# Patient Record
Sex: FEMALE | URBAN - METROPOLITAN AREA
[De-identification: names, ages, dates, MRNs, and addresses within clinical notes are randomized per-mention and may not be internally consistent; named-entity substitution may affect disease eponyms.]

---

## 2024-03-08 RX ORDER — GLIMEPIRIDE 2 MG/1
2 TABLET ORAL 2 TIMES DAILY
COMMUNITY
End: 2024-03-11 | Stop reason: SDUPTHER

## 2024-03-08 RX ORDER — GABAPENTIN 300 MG/1
300 CAPSULE ORAL 2 TIMES DAILY
COMMUNITY
End: 2024-03-11 | Stop reason: SDUPTHER

## 2024-03-08 RX ORDER — MOMETASONE FUROATE 1 MG/G
1 CREAM TOPICAL 2 TIMES DAILY
COMMUNITY
Start: 2013-05-06 | End: 2024-03-11 | Stop reason: WASHOUT

## 2024-03-08 RX ORDER — BLOOD-GLUCOSE METER
KIT MISCELLANEOUS
COMMUNITY
Start: 2019-07-09

## 2024-03-11 ENCOUNTER — LAB (OUTPATIENT)
Dept: LAB | Facility: LAB | Age: 74
End: 2024-03-11
Payer: MEDICARE

## 2024-03-11 ENCOUNTER — OFFICE VISIT (OUTPATIENT)
Dept: PRIMARY CARE | Facility: CLINIC | Age: 74
End: 2024-03-11
Payer: MEDICARE

## 2024-03-11 VITALS
SYSTOLIC BLOOD PRESSURE: 140 MMHG | OXYGEN SATURATION: 96 % | WEIGHT: 158 LBS | TEMPERATURE: 97.6 F | HEIGHT: 65 IN | HEART RATE: 92 BPM | DIASTOLIC BLOOD PRESSURE: 76 MMHG | BODY MASS INDEX: 26.33 KG/M2

## 2024-03-11 DIAGNOSIS — E11.43 DIABETIC AUTONOMIC NEUROPATHY ASSOCIATED WITH TYPE 2 DIABETES MELLITUS (MULTI): ICD-10-CM

## 2024-03-11 DIAGNOSIS — Z00.00 MEDICARE ANNUAL WELLNESS VISIT, SUBSEQUENT: Primary | ICD-10-CM

## 2024-03-11 DIAGNOSIS — R21 RASH: ICD-10-CM

## 2024-03-11 LAB
ALBUMIN SERPL BCP-MCNC: 4.4 G/DL (ref 3.4–5)
ALP SERPL-CCNC: 56 U/L (ref 33–136)
ALT SERPL W P-5'-P-CCNC: 24 U/L (ref 7–45)
ANION GAP SERPL CALC-SCNC: 14 MMOL/L (ref 10–20)
AST SERPL W P-5'-P-CCNC: 26 U/L (ref 9–39)
BASOPHILS # BLD AUTO: 0.05 X10*3/UL (ref 0–0.1)
BASOPHILS NFR BLD AUTO: 0.7 %
BILIRUB SERPL-MCNC: 0.9 MG/DL (ref 0–1.2)
BUN SERPL-MCNC: 11 MG/DL (ref 6–23)
CALCIUM SERPL-MCNC: 10.3 MG/DL (ref 8.6–10.6)
CHLORIDE SERPL-SCNC: 100 MMOL/L (ref 98–107)
CHOLEST SERPL-MCNC: 281 MG/DL (ref 0–199)
CHOLESTEROL/HDL RATIO: 2.8
CO2 SERPL-SCNC: 27 MMOL/L (ref 21–32)
CREAT SERPL-MCNC: 0.63 MG/DL (ref 0.5–1.05)
EGFRCR SERPLBLD CKD-EPI 2021: >90 ML/MIN/1.73M*2
EOSINOPHIL # BLD AUTO: 0.2 X10*3/UL (ref 0–0.4)
EOSINOPHIL NFR BLD AUTO: 2.7 %
ERYTHROCYTE [DISTWIDTH] IN BLOOD BY AUTOMATED COUNT: 13.5 % (ref 11.5–14.5)
EST. AVERAGE GLUCOSE BLD GHB EST-MCNC: 169 MG/DL
GLUCOSE SERPL-MCNC: 151 MG/DL (ref 74–99)
HBA1C MFR BLD: 7.5 %
HCT VFR BLD AUTO: 46 % (ref 36–46)
HDLC SERPL-MCNC: 101.7 MG/DL
HGB BLD-MCNC: 14.3 G/DL (ref 12–16)
IMM GRANULOCYTES # BLD AUTO: 0.03 X10*3/UL (ref 0–0.5)
IMM GRANULOCYTES NFR BLD AUTO: 0.4 % (ref 0–0.9)
LDLC SERPL CALC-MCNC: 166 MG/DL
LYMPHOCYTES # BLD AUTO: 1.67 X10*3/UL (ref 0.8–3)
LYMPHOCYTES NFR BLD AUTO: 22.2 %
MCH RBC QN AUTO: 28 PG (ref 26–34)
MCHC RBC AUTO-ENTMCNC: 31.1 G/DL (ref 32–36)
MCV RBC AUTO: 90 FL (ref 80–100)
MONOCYTES # BLD AUTO: 0.44 X10*3/UL (ref 0.05–0.8)
MONOCYTES NFR BLD AUTO: 5.9 %
NEUTROPHILS # BLD AUTO: 5.12 X10*3/UL (ref 1.6–5.5)
NEUTROPHILS NFR BLD AUTO: 68.1 %
NON HDL CHOLESTEROL: 179 MG/DL (ref 0–149)
NRBC BLD-RTO: 0 /100 WBCS (ref 0–0)
PLATELET # BLD AUTO: 359 X10*3/UL (ref 150–450)
POTASSIUM SERPL-SCNC: 4.2 MMOL/L (ref 3.5–5.3)
PROT SERPL-MCNC: 7.5 G/DL (ref 6.4–8.2)
RBC # BLD AUTO: 5.1 X10*6/UL (ref 4–5.2)
SODIUM SERPL-SCNC: 137 MMOL/L (ref 136–145)
TRIGL SERPL-MCNC: 66 MG/DL (ref 0–149)
VIT B12 SERPL-MCNC: 470 PG/ML (ref 211–911)
VLDL: 13 MG/DL (ref 0–40)
WBC # BLD AUTO: 7.5 X10*3/UL (ref 4.4–11.3)

## 2024-03-11 PROCEDURE — 3077F SYST BP >= 140 MM HG: CPT | Performed by: FAMILY MEDICINE

## 2024-03-11 PROCEDURE — 83036 HEMOGLOBIN GLYCOSYLATED A1C: CPT

## 2024-03-11 PROCEDURE — 1170F FXNL STATUS ASSESSED: CPT | Performed by: FAMILY MEDICINE

## 2024-03-11 PROCEDURE — 99214 OFFICE O/P EST MOD 30 MIN: CPT | Performed by: FAMILY MEDICINE

## 2024-03-11 PROCEDURE — 1160F RVW MEDS BY RX/DR IN RCRD: CPT | Performed by: FAMILY MEDICINE

## 2024-03-11 PROCEDURE — 1126F AMNT PAIN NOTED NONE PRSNT: CPT | Performed by: FAMILY MEDICINE

## 2024-03-11 PROCEDURE — 1036F TOBACCO NON-USER: CPT | Performed by: FAMILY MEDICINE

## 2024-03-11 PROCEDURE — 3078F DIAST BP <80 MM HG: CPT | Performed by: FAMILY MEDICINE

## 2024-03-11 PROCEDURE — 36415 COLL VENOUS BLD VENIPUNCTURE: CPT

## 2024-03-11 PROCEDURE — 82607 VITAMIN B-12: CPT

## 2024-03-11 PROCEDURE — 80061 LIPID PANEL: CPT

## 2024-03-11 PROCEDURE — 85025 COMPLETE CBC W/AUTO DIFF WBC: CPT

## 2024-03-11 PROCEDURE — 80053 COMPREHEN METABOLIC PANEL: CPT

## 2024-03-11 PROCEDURE — 99497 ADVNCD CARE PLAN 30 MIN: CPT | Performed by: FAMILY MEDICINE

## 2024-03-11 PROCEDURE — 1159F MED LIST DOCD IN RCRD: CPT | Performed by: FAMILY MEDICINE

## 2024-03-11 PROCEDURE — G0439 PPPS, SUBSEQ VISIT: HCPCS | Performed by: FAMILY MEDICINE

## 2024-03-11 RX ORDER — GLIMEPIRIDE 2 MG/1
2 TABLET ORAL 2 TIMES DAILY
Qty: 180 TABLET | Refills: 3 | Status: SHIPPED | OUTPATIENT
Start: 2024-03-11 | End: 2025-03-11

## 2024-03-11 RX ORDER — GABAPENTIN 300 MG/1
300 CAPSULE ORAL 2 TIMES DAILY
Qty: 180 CAPSULE | Refills: 3 | Status: SHIPPED | OUTPATIENT
Start: 2024-03-11 | End: 2025-03-11

## 2024-03-11 RX ORDER — GLIMEPIRIDE 2 MG/1
2 TABLET ORAL 2 TIMES DAILY
Qty: 180 TABLET | Refills: 3 | Status: SHIPPED | OUTPATIENT
Start: 2024-03-11 | End: 2024-03-11 | Stop reason: SDUPTHER

## 2024-03-11 RX ORDER — TRIAMCINOLONE ACETONIDE 1 MG/G
CREAM TOPICAL 2 TIMES DAILY
Qty: 30 G | Refills: 2 | Status: SHIPPED | OUTPATIENT
Start: 2024-03-11

## 2024-03-11 ASSESSMENT — ENCOUNTER SYMPTOMS
DEPRESSION: 0
RESPIRATORY NEGATIVE: 1
CARDIOVASCULAR NEGATIVE: 1
GASTROINTESTINAL NEGATIVE: 1
LOSS OF SENSATION IN FEET: 0
OCCASIONAL FEELINGS OF UNSTEADINESS: 0
CONSTITUTIONAL NEGATIVE: 1
PSYCHIATRIC NEGATIVE: 1
NEUROLOGICAL NEGATIVE: 1
ENDOCRINE NEGATIVE: 1
MUSCULOSKELETAL NEGATIVE: 1
ENDOCRINE COMMENTS: DM

## 2024-03-11 ASSESSMENT — ACTIVITIES OF DAILY LIVING (ADL)
DRESSING YOURSELF: INDEPENDENT
PREPARING MEALS: INDEPENDENT
ADEQUATE_TO_COMPLETE_ADL: YES
GROCERY SHOPPING: INDEPENDENT
TOILETING: INDEPENDENT
STIL DRIVING: YES
PATIENT'S MEMORY ADEQUATE TO SAFELY COMPLETE DAILY ACTIVITIES?: YES
BATHING: INDEPENDENT
DOING HOUSEWORK: INDEPENDENT
USING TELEPHONE: INDEPENDENT
NEEDS ASSISTANCE WITH FOOD: INDEPENDENT
JUDGMENT_ADEQUATE_SAFELY_COMPLETE_DAILY_ACTIVITIES: YES
USING TRANSPORTATION: INDEPENDENT
EATING: INDEPENDENT
GROOMING: INDEPENDENT
MANAGING FINANCES: INDEPENDENT
WALKS IN HOME: INDEPENDENT
FEEDING YOURSELF: INDEPENDENT
TAKING MEDICATION: INDEPENDENT

## 2024-03-11 ASSESSMENT — GERIATRIC MINI NUTRITIONAL ASSESSMENT (MNA)
A HAS FOOD INTAKE DECLINED OVER THE PAST 3 MONTHS DUE TO LOSS OF APPETITE, DIGESTIVE PROBLEMS, CHEWING OR SWALLOWING DIFFICULTIES?: NO DECREASE IN FOOD INTAKE
B WEIGHT LOSS DURING THE LAST 3 MONTHS: NO WEIGHT LOSS
E NEUROPSYCHOLOGICAL PROBLEMS: NO PSYCHOLOGICAL PROBLEMS
C GENERAL MOBILITY: GOES OUT
D HAS SUFFERED PSYCHOLOGICAL STRESS OR ACUTE DISEASE IN THE PAST 3 MONTHS?: NO

## 2024-03-11 ASSESSMENT — ANXIETY QUESTIONNAIRES
2. NOT BEING ABLE TO STOP OR CONTROL WORRYING: NOT AT ALL
GAD7 TOTAL SCORE: 0
5. BEING SO RESTLESS THAT IT IS HARD TO SIT STILL: NOT AT ALL
3. WORRYING TOO MUCH ABOUT DIFFERENT THINGS: NOT AT ALL
1. FEELING NERVOUS, ANXIOUS, OR ON EDGE: NOT AT ALL
4. TROUBLE RELAXING: NOT AT ALL
7. FEELING AFRAID AS IF SOMETHING AWFUL MIGHT HAPPEN: NOT AT ALL
6. BECOMING EASILY ANNOYED OR IRRITABLE: NOT AT ALL

## 2024-03-11 ASSESSMENT — PAIN SCALES - GENERAL: PAINLEVEL: 0-NO PAIN

## 2024-03-11 NOTE — PROGRESS NOTES
"Subjective   Patient ID: Safia Pina is a 73 y.o. female who presents for Annual Exam (Assessment annual medicare wellness).    HPI     Review of Systems   Constitutional: Negative.    HENT: Negative.     Respiratory: Negative.     Cardiovascular: Negative.    Gastrointestinal: Negative.    Endocrine: Negative.         DM   Genitourinary: Negative.    Musculoskeletal: Negative.    Neurological: Negative.    Psychiatric/Behavioral: Negative.         Objective   /76 (BP Location: Left arm)   Pulse 92   Temp 36.4 °C (97.6 °F) (Temporal)   Ht 1.651 m (5' 5\")   Wt 71.7 kg (158 lb)   SpO2 96%   BMI 26.29 kg/m²     Physical Exam  Vitals and nursing note reviewed.   Constitutional:       Appearance: Normal appearance.   HENT:      Right Ear: Tympanic membrane normal.      Left Ear: Tympanic membrane normal.      Mouth/Throat:      Pharynx: Oropharynx is clear.   Cardiovascular:      Rate and Rhythm: Normal rate and regular rhythm.      Heart sounds: Normal heart sounds.   Pulmonary:      Breath sounds: Normal breath sounds.   Abdominal:      Palpations: Abdomen is soft.   Musculoskeletal:         General: Normal range of motion.   Feet:      Right foot:      Protective Sensation: 3 sites tested.        Left foot:      Protective Sensation: 3 sites tested.     Skin:     Comments: eczema   Neurological:      General: No focal deficit present.      Mental Status: She is alert and oriented to person, place, and time.   Psychiatric:         Mood and Affect: Mood normal.         Behavior: Behavior normal.         Assessment/Plan patient seen here for an annual Medicare wellness exam.  We reviewed her questionnaire she is agreeable to her responses.  We did discuss advanced directives.  She has no significant difficulty with depression or anxiety.  We are checking her lab work here today.  Will let her know the results as soon as available.  Will see her back in a year  Problem List Items Addressed This Visit        "      ICD-10-CM    Diabetic autonomic neuropathy associated with type 2 diabetes mellitus (CMS/[facility]) E11.43    Relevant Medications    glimepiride (Amaryl) 2 mg tablet    Other Relevant Orders    Lipid Panel    CBC and Auto Differential    Comprehensive Metabolic Panel    Hemoglobin A1C    Vitamin B12     Other Visit Diagnoses         Codes    Medicare annual wellness visit, subsequent    -  Primary Z00.00    BMI 26.0-26.9,adult     Z68.26    Rash     R21    Relevant Medications    triamcinolone (Kenalog) 0.1 % cream

## 2024-03-11 NOTE — ACP (ADVANCE CARE PLANNING)
Confirming Previous Code Status:   Advance Care Planning Note     Discussion Date: 03/11/24   Discussion Participants: patient    The patient wishes to discuss Advance Care Planning today and the following is a brief summary of our discussion.     Patient has capacity to make their own medical decisions: Yes  Health Care Agent/Surrogate Decision Maker documented in chart: Yes    Documents on file and valid:  Advance Directive/Living Will: Yes   Health Care Power of : Yes  Other: none    Communication of Medical Status/Prognosis:   yes     Communication of Treatment Goals/Options:   yes     Treatment Decisions  Goals of Care: survival is paramount regardless of prognosis, treatment outcome, or burden   yes  Follow Up Plan  no  Team Members  myself  Time Statement: Total face to face time spent on advance care planning was 16 minutes with 16 minutes spent in counseling, including the explanation.    Shaheed Hair DO  3/11/2024 12:26 PM

## 2024-07-12 ENCOUNTER — HOSPITAL ENCOUNTER (EMERGENCY)
Facility: HOSPITAL | Age: 74
Discharge: HOME | End: 2024-07-12
Attending: STUDENT IN AN ORGANIZED HEALTH CARE EDUCATION/TRAINING PROGRAM
Payer: MEDICARE

## 2024-07-12 VITALS
HEART RATE: 89 BPM | SYSTOLIC BLOOD PRESSURE: 192 MMHG | DIASTOLIC BLOOD PRESSURE: 81 MMHG | TEMPERATURE: 99.1 F | OXYGEN SATURATION: 97 % | RESPIRATION RATE: 20 BRPM

## 2024-07-12 DIAGNOSIS — M79.652 ACUTE PAIN OF LEFT THIGH: Primary | ICD-10-CM

## 2024-07-12 PROCEDURE — 99283 EMERGENCY DEPT VISIT LOW MDM: CPT | Performed by: STUDENT IN AN ORGANIZED HEALTH CARE EDUCATION/TRAINING PROGRAM

## 2024-07-12 PROCEDURE — 2500000004 HC RX 250 GENERAL PHARMACY W/ HCPCS (ALT 636 FOR OP/ED): Performed by: STUDENT IN AN ORGANIZED HEALTH CARE EDUCATION/TRAINING PROGRAM

## 2024-07-12 PROCEDURE — 96372 THER/PROPH/DIAG INJ SC/IM: CPT | Performed by: STUDENT IN AN ORGANIZED HEALTH CARE EDUCATION/TRAINING PROGRAM

## 2024-07-12 RX ORDER — KETOROLAC TROMETHAMINE 30 MG/ML
15 INJECTION, SOLUTION INTRAMUSCULAR; INTRAVENOUS ONCE
Status: COMPLETED | OUTPATIENT
Start: 2024-07-12 | End: 2024-07-12

## 2024-07-12 ASSESSMENT — PAIN DESCRIPTION - PAIN TYPE
TYPE: ACUTE PAIN
TYPE: ACUTE PAIN

## 2024-07-12 ASSESSMENT — PAIN - FUNCTIONAL ASSESSMENT
PAIN_FUNCTIONAL_ASSESSMENT: 0-10
PAIN_FUNCTIONAL_ASSESSMENT: 0-10

## 2024-07-12 ASSESSMENT — PAIN SCALES - GENERAL
PAINLEVEL_OUTOF10: 9
PAINLEVEL_OUTOF10: 5 - MODERATE PAIN
PAINLEVEL_OUTOF10: 9

## 2024-07-12 ASSESSMENT — COLUMBIA-SUICIDE SEVERITY RATING SCALE - C-SSRS
6. HAVE YOU EVER DONE ANYTHING, STARTED TO DO ANYTHING, OR PREPARED TO DO ANYTHING TO END YOUR LIFE?: NO
1. IN THE PAST MONTH, HAVE YOU WISHED YOU WERE DEAD OR WISHED YOU COULD GO TO SLEEP AND NOT WAKE UP?: NO
2. HAVE YOU ACTUALLY HAD ANY THOUGHTS OF KILLING YOURSELF?: NO

## 2024-07-12 ASSESSMENT — PAIN DESCRIPTION - ORIENTATION
ORIENTATION_2: LEFT;UPPER;POSTERIOR
ORIENTATION: LEFT

## 2024-07-12 ASSESSMENT — PAIN DESCRIPTION - LOCATION
LOCATION_2: LEG
LOCATION: KNEE

## 2024-07-12 ASSESSMENT — PAIN DESCRIPTION - FREQUENCY: FREQUENCY: CONSTANT/CONTINUOUS

## 2024-07-12 ASSESSMENT — PAIN DESCRIPTION - PROGRESSION: CLINICAL_PROGRESSION: NOT CHANGED

## 2024-07-12 ASSESSMENT — PAIN DESCRIPTION - ONSET: ONSET: SUDDEN

## 2024-07-12 NOTE — ED PROVIDER NOTES
HPI   Chief Complaint   Patient presents with    Fall     Patient states was walking with disabled son when son started to fall and she fell with him, now states back of left thigh and knee pain, unable to ambulate at this time       History of diabetes presents with left leg pain.  States that she was assisting her special needs son when he began to fell and she helped to and she tried to help catch him.  States that she somewhat bent down with him and noted a pain to the back of her leg.  Has been able to ambulate, and intermittently feels worsening pain to the back of the left leg.  No falls to the ground.  No other trauma to the area.  No trauma to the head.  No loss consciousness.  Did take Tylenol prior to arrival with some improvement.      History provided by:  Patient   used: No                        Davis Coma Scale Score: 15                     Patient History   Past Medical History:   Diagnosis Date    Body mass index (BMI) 24.0-24.9, adult 12/20/2021    Body mass index (BMI) of 24.0 to 24.9 in adult    Body mass index (BMI) 26.0-26.9, adult 03/14/2022    BMI 26.0-26.9,adult    Personal history of other specified conditions 03/29/2019    History of abnormal weight loss     Past Surgical History:   Procedure Laterality Date    OTHER SURGICAL HISTORY  03/29/2019    Gallbladder surgery     No family history on file.  Social History     Tobacco Use    Smoking status: Never    Smokeless tobacco: Never   Substance Use Topics    Alcohol use: Not on file    Drug use: Never       Physical Exam   ED Triage Vitals [07/12/24 1211]   Temperature Heart Rate Respirations BP   37.3 °C (99.1 °F) 89 20 (!) 192/81      Pulse Ox Temp Source Heart Rate Source Patient Position   97 % Skin Monitor Sitting      BP Location FiO2 (%)     Left arm --       Physical Exam  Vitals and nursing note reviewed.   HENT:      Head: Atraumatic.      Mouth/Throat:      Mouth: Mucous membranes are moist.   Eyes:       Conjunctiva/sclera: Conjunctivae normal.   Cardiovascular:      Rate and Rhythm: Normal rate and regular rhythm.   Pulmonary:      Effort: Pulmonary effort is normal.   Abdominal:      Palpations: Abdomen is soft.      Tenderness: There is no abdominal tenderness.   Musculoskeletal:         General: No deformity.      Cervical back: Normal range of motion.      Comments: No spinal tenderness palpation.  Pelvis is stable.  Right lower extremity: No obvious deformities.  No bony tenderness palpation.  Full active range of motion.  Neurovascular intact.  Left lower extremity: No obvious deformities.  There is no bony tenderness palpation.  There is tenderness to the posterior aspect of the thigh, particularly the mid/proximal hamstring area.  There is no obvious overlying ecchymosis.  No masses palpated.  Extensor mechanism is intact.  Full active range of motion.  5/5 strength in the extremity with pain with resisted knee flexion.  Sensation tact light touch in all dermatomal distributions.  2+ DP and PT pulse with brisk cap refill of all digits.  No ligamentous laxity with Lachman's, posterior drawer, varus load, or valgus load.   Skin:     General: Skin is warm and dry.   Neurological:      Mental Status: She is alert.      Comments: Moving all extremities         ED Course & MDM   ED Course as of 07/12/24 1315   Fri Jul 12, 2024   1313 Able to ambulate, although uncomfortable.  Given patient's location of pain as well as pain with knee flexion, suspect hamstring injury. [AB]      ED Course User Index  [AB] oJshua Lawson MD         Diagnoses as of 07/12/24 1315   Acute pain of left thigh       Medical Decision Making  Presents with left leg pain after attempting to catch her son that was falling to the ground.  Given the posterior location of the pain as well as pain with flexion at the knee, suspect hamstring injury, particularly strain.  Pain control with IM Toradol emergency department.  Will have her follow  with orthopedic surgery/sports medicine.  Discussed return precautions at length.      My suspicion for acute bony injury is quite low at this time will not obtain x-rays.            Procedure  Procedures     Joshua Lawson MD  07/12/24 3906

## 2024-07-12 NOTE — DISCHARGE INSTRUCTIONS
As we discussed the emergency department, we think that the pain to the back of your leg is from a hamstring injury.  We would encourage you to rest this muscle, ice it, elevate it and use medication such as Tylenol and ibuprofen.  If you are unable to walk or if the pain worsens, you can always return to the emergency department for further evaluation.  Please follow with your primary care physician.  You have also been given her referral to sports medicine and provided with names of local physicians.

## 2024-07-12 NOTE — ED TRIAGE NOTES
Patient states was walking with disabled son when son started to fall and she fell with him, now states back of left thigh and knee pain, unable to ambulate at this time

## 2024-07-15 ENCOUNTER — APPOINTMENT (OUTPATIENT)
Dept: ORTHOPEDIC SURGERY | Facility: CLINIC | Age: 74
End: 2024-07-15
Payer: MEDICARE

## 2024-07-15 VITALS — WEIGHT: 155 LBS | BODY MASS INDEX: 25.83 KG/M2 | HEIGHT: 65 IN

## 2024-07-15 DIAGNOSIS — M79.652 ACUTE PAIN OF LEFT THIGH: ICD-10-CM

## 2024-07-15 DIAGNOSIS — S76.312A HAMSTRING STRAIN, LEFT, INITIAL ENCOUNTER: Primary | ICD-10-CM

## 2024-07-15 PROCEDURE — 1160F RVW MEDS BY RX/DR IN RCRD: CPT | Performed by: FAMILY MEDICINE

## 2024-07-15 PROCEDURE — 3051F HG A1C>EQUAL 7.0%<8.0%: CPT | Performed by: FAMILY MEDICINE

## 2024-07-15 PROCEDURE — 1159F MED LIST DOCD IN RCRD: CPT | Performed by: FAMILY MEDICINE

## 2024-07-15 PROCEDURE — 1036F TOBACCO NON-USER: CPT | Performed by: FAMILY MEDICINE

## 2024-07-15 PROCEDURE — 99203 OFFICE O/P NEW LOW 30 MIN: CPT | Performed by: FAMILY MEDICINE

## 2024-07-15 PROCEDURE — 1157F ADVNC CARE PLAN IN RCRD: CPT | Performed by: FAMILY MEDICINE

## 2024-07-15 PROCEDURE — 3050F LDL-C >= 130 MG/DL: CPT | Performed by: FAMILY MEDICINE

## 2024-07-15 NOTE — PROGRESS NOTES
History of Present Illness   Chief Complaint   Patient presents with    Left Thigh - Pain     DOI:7/12/24  ATTEMPTED TO EASE THE FALL OF HER SON AND HAD PAIN IN HER HAMSTRING AFTER       The patient is 73 y.o. female  here with a complaint of left proximal leg/posterior thigh pain.  Onset of symptoms 3 days ago, says she was assisting her special-needs son when he began to fall, she tried to help catch him, and was bending down and felt some pain in the back of her leg/thigh.  She was having some difficulty/pain with walking/weightbearing prompting her to be seen in the emergency department, they recommended outpatient orthopedic follow-up, they did not obtain any x-rays given lack of significant trauma.  She was given some IM Toradol with good improvement in symptoms.  She admits to ongoing pain over the posterior aspect of the thigh.  Symptoms have improved some over the past 2 days but still present.  She has pain with sitting, she does have pain with walking.  She denies any bruising.    Past Medical History:   Diagnosis Date    Body mass index (BMI) 24.0-24.9, adult 12/20/2021    Body mass index (BMI) of 24.0 to 24.9 in adult    Body mass index (BMI) 26.0-26.9, adult 03/14/2022    BMI 26.0-26.9,adult    Personal history of other specified conditions 03/29/2019    History of abnormal weight loss       Medication Documentation Review Audit       Reviewed by Shaheed Hair DO (Physician) on 03/11/24 at 1213      Medication Order Taking? Sig Documenting Provider Last Dose Status   FreeStyle Lite Strips strip [id number]  TEST EVERY DAY AS DIRECTED Historical Provider, MD  Active   gabapentin (Neurontin) 300 mg capsule [id number]  Take 1 capsule (300 mg) by mouth 2 times a day. Historical Provider, MD  Active   glimepiride (Amaryl) 2 mg tablet [id number]  Take 1 tablet (2 mg) by mouth 2 times a day. Historical Provider, MD  Active     Discontinued 03/11/24 1212                    No Known Allergies    Social  History     Socioeconomic History    Marital status: Single     Spouse name: Not on file    Number of children: Not on file    Years of education: Not on file    Highest education level: Not on file   Occupational History    Not on file   Tobacco Use    Smoking status: Never    Smokeless tobacco: Never   Substance and Sexual Activity    Alcohol use: Never    Drug use: Never    Sexual activity: Not on file   Other Topics Concern    Not on file   Social History Narrative    Not on file     Social Determinants of Health     Financial Resource Strain: Not on file   Food Insecurity: Not on file   Transportation Needs: Not on file   Physical Activity: Not on file   Stress: Not on file   Social Connections: Not on file   Intimate Partner Violence: Not on file   Housing Stability: Not on file       Past Surgical History:   Procedure Laterality Date    OTHER SURGICAL HISTORY  03/29/2019    Gallbladder surgery          Review of Systems   GENERAL: Negative  GI: Negative  MUSCULOSKELETAL: See HPI  SKIN: Negative  NEURO:  Negative     Physical Exam:    General/Constitutional: well appearing, no distress, appears stated age  HEENT: sclera clear  Respiratory: non labored breathing  Vascular: No edema, swelling or tenderness, except as noted in detailed exam.  Integumentary: No impressive skin lesions present, except as noted in detailed exam.  Neurological:  Alert and oriented   Psychological:  Normal mood and affect.  Musculoskeletal: Normal, except as noted in detailed exam and in HPI.  Mildly antalgic gait, unassisted    Left lower extremity: Normal appearance, there is no ecchymosis in the posterior thigh.  She is most tender palpation at the midportion of the hamstring muscle belly, no distal hamstring tenderness, she has nontender at the ischial tuberosity/proximal hamstring, there is no palpable defect.  She has pain and weakness, 3 out of 5 with both resisted hip extension and knee flexion.  She has pain with passive hip  flexion with knee fully extended in the hamstring muscle belly.       Imaging: No imaging today      Assessment   1. Hamstring strain, left, initial encounter        2. Acute pain of left thigh  Referral to Sports Medicine            Plan: Discussed diagnosis, reviewed further workup and treatment.  Symptoms consistent with hamstring muscle belly strain, no concern for proximal hamstring tendon injury.  Recommending conservative management.  I did wrap in Ace bandage for some compression which she can do over the next few days, she will ice and take over-the-counter medications as needed, she can do some gentle hamstring stretching.  I like to see her back in 2 weeks for reassessment, she may need some formal physical therapy but she says that she has difficulty finding someone to care for her son when she is away.

## 2024-07-29 ENCOUNTER — APPOINTMENT (OUTPATIENT)
Dept: ORTHOPEDIC SURGERY | Facility: CLINIC | Age: 74
End: 2024-07-29
Payer: MEDICARE

## 2024-07-29 DIAGNOSIS — S76.312D HAMSTRING STRAIN, LEFT, SUBSEQUENT ENCOUNTER: Primary | ICD-10-CM

## 2024-07-29 PROCEDURE — 99213 OFFICE O/P EST LOW 20 MIN: CPT | Performed by: FAMILY MEDICINE

## 2024-07-29 PROCEDURE — 3051F HG A1C>EQUAL 7.0%<8.0%: CPT | Performed by: FAMILY MEDICINE

## 2024-07-29 PROCEDURE — 3050F LDL-C >= 130 MG/DL: CPT | Performed by: FAMILY MEDICINE

## 2024-07-29 PROCEDURE — 1157F ADVNC CARE PLAN IN RCRD: CPT | Performed by: FAMILY MEDICINE

## 2024-07-29 NOTE — PROGRESS NOTES
History of Present Illness   Chief Complaint   Patient presents with    Left Thigh - Pain     DOI:7/12/24       The patient is 73 y.o. female  here for follow-up of left hamstring injury.  Onset of symptoms a little over 2 weeks ago, injured trying to hold up her special-needs son who was falling.  At her initial visit symptoms were consistent with hamstring muscle belly strain with more prominent tenderness in the proximal muscle belly, no significant tenderness at the ischial tuberosity, there was weakness with hip extension and knee flexion.  Recommended conservative management with compression, gentle stretching, ice.  She admits to pretty significant improvement over the past 2 weeks, says after seeing me she did develop some more notable bruising in the back of her thigh which has progressed down to her knee.  She has been taking over-the-counter medication sparingly for pain.  She does have some pain with sitting, she is having less pain with walking.  She denies any new injuries or symptoms.        Past Medical History:   Diagnosis Date    Body mass index (BMI) 24.0-24.9, adult 12/20/2021    Body mass index (BMI) of 24.0 to 24.9 in adult    Body mass index (BMI) 26.0-26.9, adult 03/14/2022    BMI 26.0-26.9,adult    Personal history of other specified conditions 03/29/2019    History of abnormal weight loss       Medication Documentation Review Audit       Reviewed by Conner Varner MD (Physician) on 07/15/24 at 1528      Medication Order Taking? Sig Documenting Provider Last Dose Status   FreeStyle Lite Strips strip [id number]  TEST EVERY DAY AS DIRECTED Historical Provider, MD  Active   gabapentin (Neurontin) 300 mg capsule [id number]  Take 1 capsule (300 mg) by mouth 2 times a day. Shaheed Hair, DO  Active   glimepiride (Amaryl) 2 mg tablet [id number]  Take 1 tablet (2 mg) by mouth 2 times a day. Shaheed Hair DO  Active   triamcinolone (Kenalog) 0.1 % cream [id number]  Apply topically 2 times a  day. Apply to affected area 1-2 times daily as needed. Avoid face and groin. Shaheed ALLISON Sustersic, DO  Active                    No Known Allergies    Social History     Socioeconomic History    Marital status: Single     Spouse name: Not on file    Number of children: Not on file    Years of education: Not on file    Highest education level: Not on file   Occupational History    Not on file   Tobacco Use    Smoking status: Never    Smokeless tobacco: Never   Substance and Sexual Activity    Alcohol use: Never    Drug use: Never    Sexual activity: Not on file   Other Topics Concern    Not on file   Social History Narrative    Not on file     Social Determinants of Health     Financial Resource Strain: Not on file   Food Insecurity: Not on file   Transportation Needs: Not on file   Physical Activity: Not on file   Stress: Not on file   Social Connections: Not on file   Intimate Partner Violence: Not on file   Housing Stability: Not on file       Past Surgical History:   Procedure Laterality Date    OTHER SURGICAL HISTORY  03/29/2019    Gallbladder surgery          Review of Systems   GENERAL: Negative  GI: Negative  MUSCULOSKELETAL: See HPI  SKIN: Negative  NEURO:  Negative     Physical Exam:    General/Constitutional: well appearing, no distress, appears stated age  HEENT: sclera clear  Respiratory: non labored breathing  Vascular: No edema, swelling or tenderness, except as noted in detailed exam.  Integumentary: No impressive skin lesions present, except as noted in detailed exam.  Neurological:  Alert and oriented   Psychological:  Normal mood and affect.  Musculoskeletal: Normal, except as noted in detailed exam and in HPI.  Mildly antalgic gait, unassisted    Left lower extremity: There is some interval development of ecchymosis in the posterior thigh to below the knee, otherwise normal appearance, thigh is soft, compressible.  She remains tender at the proximal hamstring muscle belly, nontender at the ischial  tuberosity.  She has improved strength with resisted knee flexion 4-5, still with pain, 3 out of 5 strength with resisted hip extension with pain.  There is discomfort with passive hamstring stretch, improved from previous visit       Imaging: No imaging today      Assessment   1. Hamstring strain, left, subsequent encounter          2 weeks out from injury, improving, developed some more notable bruising suggestive of higher grade strain, symptoms/tenderness most prominent over muscle belly, still with no tenderness at the ischial tuberosity      Plan: Recommending conservative management, she was given a home exercise rehabilitation program that she began that focuses on some hamstring stretching, gentle strengthening, activation exercises, dissipate continued improvement in symptoms with conservative treatment.  He preferred home exercise therapy over  in person therapy because of the need to care for her special needs son with lack of help at home.  She will follow-up in 1 month for reassessment.

## 2024-08-26 ENCOUNTER — APPOINTMENT (OUTPATIENT)
Dept: ORTHOPEDIC SURGERY | Facility: CLINIC | Age: 74
End: 2024-08-26
Payer: MEDICARE

## 2024-08-26 DIAGNOSIS — S76.312D HAMSTRING STRAIN, LEFT, SUBSEQUENT ENCOUNTER: Primary | ICD-10-CM

## 2024-08-26 PROCEDURE — 3051F HG A1C>EQUAL 7.0%<8.0%: CPT | Performed by: FAMILY MEDICINE

## 2024-08-26 PROCEDURE — 1160F RVW MEDS BY RX/DR IN RCRD: CPT | Performed by: FAMILY MEDICINE

## 2024-08-26 PROCEDURE — 3050F LDL-C >= 130 MG/DL: CPT | Performed by: FAMILY MEDICINE

## 2024-08-26 PROCEDURE — 1159F MED LIST DOCD IN RCRD: CPT | Performed by: FAMILY MEDICINE

## 2024-08-26 PROCEDURE — 99213 OFFICE O/P EST LOW 20 MIN: CPT | Performed by: FAMILY MEDICINE

## 2024-08-26 PROCEDURE — 1157F ADVNC CARE PLAN IN RCRD: CPT | Performed by: FAMILY MEDICINE

## 2024-08-26 PROCEDURE — 1036F TOBACCO NON-USER: CPT | Performed by: FAMILY MEDICINE

## 2024-08-26 NOTE — PROGRESS NOTES
History of Present Illness   Chief Complaint   Patient presents with    Left Thigh - Follow-up       The patient is 73 y.o. female  here for follow-up of left hamstring injury.  Patient is approximately 6 weeks out from injury which occurred trying to hold up her special-needs son who is falling.  Symptoms at initial and 2-week follow-up visit were consistent with injury to the muscle belly proximally with less concern for proximal hamstring injury at ischial tuberosity.  At her 2-week follow-up visit there was some more notable bruising in the thigh and posterior knee, symptoms were overall improving.  Patient was interested in conservative management, she preferred home exercise rehabilitation over formal physical therapy because of a concern for anyone to help care for her son.  She has been doing home exercises for the past 4 weeks.  She does admit to continued improvement in symptoms overall.  Still having some very minimal residual discomfort in the hamstring area.  She said all of her bruising has resolved.  She is not having any issues caring for her son at this time.  She is pleased with her progress.  She says that she could be better with her home exercises, doing very infrequently.          Past Medical History:   Diagnosis Date    Body mass index (BMI) 24.0-24.9, adult 12/20/2021    Body mass index (BMI) of 24.0 to 24.9 in adult    Body mass index (BMI) 26.0-26.9, adult 03/14/2022    BMI 26.0-26.9,adult    Personal history of other specified conditions 03/29/2019    History of abnormal weight loss       Medication Documentation Review Audit       Reviewed by Conner Varner MD (Physician) on 07/15/24 at 1528      Medication Order Taking? Sig Documenting Provider Last Dose Status   FreeStyle Lite Strips strip [id number]  TEST EVERY DAY AS DIRECTED Historical Provider, MD  Active   gabapentin (Neurontin) 300 mg capsule [id number]  Take 1 capsule (300 mg) by mouth 2 times a day. Shaheed Hair, DO  Active    glimepiride (Amaryl) 2 mg tablet [id number]  Take 1 tablet (2 mg) by mouth 2 times a day. Shaheed Hair, DO  Active   triamcinolone (Kenalog) 0.1 % cream [id number]  Apply topically 2 times a day. Apply to affected area 1-2 times daily as needed. Avoid face and groin. Shaheed Jamesic, DO  Active                    No Known Allergies    Social History     Socioeconomic History    Marital status: Single     Spouse name: Not on file    Number of children: Not on file    Years of education: Not on file    Highest education level: Not on file   Occupational History    Not on file   Tobacco Use    Smoking status: Never    Smokeless tobacco: Never   Substance and Sexual Activity    Alcohol use: Never    Drug use: Never    Sexual activity: Not on file   Other Topics Concern    Not on file   Social History Narrative    Not on file     Social Determinants of Health     Financial Resource Strain: Not on file   Food Insecurity: Not on file   Transportation Needs: Not on file   Physical Activity: Not on file   Stress: Not on file   Social Connections: Not on file   Intimate Partner Violence: Not on file   Housing Stability: Not on file       Past Surgical History:   Procedure Laterality Date    OTHER SURGICAL HISTORY  03/29/2019    Gallbladder surgery          Review of Systems   GENERAL: Negative  GI: Negative  MUSCULOSKELETAL: See HPI  SKIN: Negative  NEURO:  Negative     Physical Exam:    General/Constitutional: well appearing, no distress, appears stated age  HEENT: sclera clear  Respiratory: non labored breathing  Vascular: No edema, swelling or tenderness, except as noted in detailed exam.  Integumentary: No impressive skin lesions present, except as noted in detailed exam.  Neurological:  Alert and oriented   Psychological:  Normal mood and affect.  Musculoskeletal: Normal, except as noted in detailed exam and in HPI.  Mildly antalgic gait, unassisted    Left lower extremity: Normal appearance, ecchymosis has  resolved.  There is some mild residual tenderness at the proximal hamstring muscle belly. nontender at the ischial tuberosity.  She has good improvement in strength, 5 out of 5 with resisted knee flexion, 4+ out of 5 with resisted hip extension both with very mild pain.  There is some mild discomfort with passive hamstring stretch.         Imaging: No imaging today      Assessment   1. Hamstring strain, left, subsequent encounter            6 weeks out from injury, treating conservatively, overall improving with some mild residual discomfort    Plan: Recommend continue conservative management, did encourage her to attempt some more home exercises to help with her mild residual discomfort though this may improve on its own with time.  Patient voiced understanding.  At this time she will plan to follow-up as symptoms dictate.

## 2025-03-17 ENCOUNTER — APPOINTMENT (OUTPATIENT)
Dept: PRIMARY CARE | Facility: CLINIC | Age: 75
End: 2025-03-17
Payer: MEDICARE

## 2025-03-17 ENCOUNTER — HOSPITAL ENCOUNTER (OUTPATIENT)
Dept: RADIOLOGY | Facility: CLINIC | Age: 75
Discharge: HOME | End: 2025-03-17
Payer: MEDICARE

## 2025-03-17 VITALS
SYSTOLIC BLOOD PRESSURE: 178 MMHG | WEIGHT: 155.8 LBS | TEMPERATURE: 98.6 F | HEART RATE: 92 BPM | OXYGEN SATURATION: 94 % | BODY MASS INDEX: 25.96 KG/M2 | DIASTOLIC BLOOD PRESSURE: 74 MMHG | HEIGHT: 65 IN

## 2025-03-17 DIAGNOSIS — M51.9 LUMBAR DISC DISEASE: ICD-10-CM

## 2025-03-17 DIAGNOSIS — M75.22 BICEPS TENDINITIS OF LEFT UPPER EXTREMITY: ICD-10-CM

## 2025-03-17 DIAGNOSIS — Z00.00 MEDICARE ANNUAL WELLNESS VISIT, SUBSEQUENT: Primary | ICD-10-CM

## 2025-03-17 DIAGNOSIS — E11.43 DIABETIC AUTONOMIC NEUROPATHY ASSOCIATED WITH TYPE 2 DIABETES MELLITUS (MULTI): ICD-10-CM

## 2025-03-17 DIAGNOSIS — E66.3 OVERWEIGHT WITH BODY MASS INDEX (BMI) 25.0-29.9: ICD-10-CM

## 2025-03-17 DIAGNOSIS — I10 BENIGN ESSENTIAL HYPERTENSION: ICD-10-CM

## 2025-03-17 DIAGNOSIS — E78.5 HYPERLIPIDEMIA, UNSPECIFIED HYPERLIPIDEMIA TYPE: ICD-10-CM

## 2025-03-17 PROBLEM — R53.83 FATIGUE: Status: ACTIVE | Noted: 2025-03-17

## 2025-03-17 PROBLEM — M17.12 PRIMARY OSTEOARTHRITIS OF LEFT KNEE: Status: ACTIVE | Noted: 2025-03-17

## 2025-03-17 PROBLEM — H93.11 SUBJECTIVE TINNITUS OF RIGHT EAR: Status: ACTIVE | Noted: 2025-03-17

## 2025-03-17 PROBLEM — H93.19 TINNITUS: Status: ACTIVE | Noted: 2025-03-17

## 2025-03-17 PROBLEM — L30.9 ECZEMA: Status: ACTIVE | Noted: 2025-03-17

## 2025-03-17 PROBLEM — R21 RASH: Status: ACTIVE | Noted: 2025-03-17

## 2025-03-17 PROBLEM — H90.3 SENSORINEURAL HEARING LOSS, BILATERAL: Status: ACTIVE | Noted: 2025-03-17

## 2025-03-17 PROBLEM — M75.20 BICEPS TENDINITIS: Status: ACTIVE | Noted: 2025-03-17

## 2025-03-17 PROBLEM — E11.40 DIABETIC NEUROPATHY (MULTI): Status: ACTIVE | Noted: 2025-03-17

## 2025-03-17 PROBLEM — R63.4 ABNORMAL WEIGHT LOSS: Status: ACTIVE | Noted: 2025-03-17

## 2025-03-17 PROCEDURE — 1157F ADVNC CARE PLAN IN RCRD: CPT | Performed by: FAMILY MEDICINE

## 2025-03-17 PROCEDURE — 3078F DIAST BP <80 MM HG: CPT | Performed by: FAMILY MEDICINE

## 2025-03-17 PROCEDURE — 1170F FXNL STATUS ASSESSED: CPT | Performed by: FAMILY MEDICINE

## 2025-03-17 PROCEDURE — 73030 X-RAY EXAM OF SHOULDER: CPT | Mod: LT

## 2025-03-17 PROCEDURE — 99497 ADVNCD CARE PLAN 30 MIN: CPT | Performed by: FAMILY MEDICINE

## 2025-03-17 PROCEDURE — 3077F SYST BP >= 140 MM HG: CPT | Performed by: FAMILY MEDICINE

## 2025-03-17 PROCEDURE — 73030 X-RAY EXAM OF SHOULDER: CPT | Mod: LEFT SIDE | Performed by: RADIOLOGY

## 2025-03-17 PROCEDURE — G0439 PPPS, SUBSEQ VISIT: HCPCS | Performed by: FAMILY MEDICINE

## 2025-03-17 PROCEDURE — 1160F RVW MEDS BY RX/DR IN RCRD: CPT | Performed by: FAMILY MEDICINE

## 2025-03-17 PROCEDURE — 99214 OFFICE O/P EST MOD 30 MIN: CPT | Performed by: FAMILY MEDICINE

## 2025-03-17 PROCEDURE — 1125F AMNT PAIN NOTED PAIN PRSNT: CPT | Performed by: FAMILY MEDICINE

## 2025-03-17 PROCEDURE — 1036F TOBACCO NON-USER: CPT | Performed by: FAMILY MEDICINE

## 2025-03-17 PROCEDURE — 1159F MED LIST DOCD IN RCRD: CPT | Performed by: FAMILY MEDICINE

## 2025-03-17 RX ORDER — NITROFURANTOIN 25; 75 MG/1; MG/1
CAPSULE ORAL
COMMUNITY
Start: 2021-08-10 | End: 2025-03-17 | Stop reason: ALTCHOICE

## 2025-03-17 RX ORDER — METHYLPREDNISOLONE 4 MG/1
TABLET ORAL
COMMUNITY
Start: 2022-12-21 | End: 2025-03-17 | Stop reason: ALTCHOICE

## 2025-03-17 ASSESSMENT — ACTIVITIES OF DAILY LIVING (ADL)
GROOMING: INDEPENDENT
STIL DRIVING: YES
NEEDS ASSISTANCE WITH FOOD: INDEPENDENT
FEEDING YOURSELF: INDEPENDENT
USING TRANSPORTATION: INDEPENDENT
EATING: INDEPENDENT
DRESSING YOURSELF: INDEPENDENT
JUDGMENT_ADEQUATE_SAFELY_COMPLETE_DAILY_ACTIVITIES: YES
TOILETING: INDEPENDENT
PREPARING MEALS: INDEPENDENT
USING TELEPHONE: INDEPENDENT
WALKS IN HOME: INDEPENDENT
DOING HOUSEWORK: INDEPENDENT
ADEQUATE_TO_COMPLETE_ADL: YES
PATIENT'S MEMORY ADEQUATE TO SAFELY COMPLETE DAILY ACTIVITIES?: YES
MANAGING FINANCES: INDEPENDENT
BATHING: INDEPENDENT
TAKING MEDICATION: INDEPENDENT
GROCERY SHOPPING: INDEPENDENT

## 2025-03-17 ASSESSMENT — GERIATRIC MINI NUTRITIONAL ASSESSMENT (MNA)
B WEIGHT LOSS DURING THE LAST 3 MONTHS: NO WEIGHT LOSS
C GENERAL MOBILITY: GOES OUT
A HAS FOOD INTAKE DECLINED OVER THE PAST 3 MONTHS DUE TO LOSS OF APPETITE, DIGESTIVE PROBLEMS, CHEWING OR SWALLOWING DIFFICULTIES?: NO DECREASE IN FOOD INTAKE
E NEUROPSYCHOLOGICAL PROBLEMS: NO PSYCHOLOGICAL PROBLEMS
D HAS SUFFERED PSYCHOLOGICAL STRESS OR ACUTE DISEASE IN THE PAST 3 MONTHS?: NO

## 2025-03-17 ASSESSMENT — ENCOUNTER SYMPTOMS
ENDOCRINE NEGATIVE: 1
CONSTITUTIONAL NEGATIVE: 1
NEUROLOGICAL NEGATIVE: 1
GASTROINTESTINAL NEGATIVE: 1
NERVOUS/ANXIOUS: 1
ARTHRALGIAS: 1
DEPRESSION: 0
RESPIRATORY NEGATIVE: 1
LOSS OF SENSATION IN FEET: 0
CARDIOVASCULAR NEGATIVE: 1
ENDOCRINE COMMENTS: DM
OCCASIONAL FEELINGS OF UNSTEADINESS: 0
BACK PAIN: 1

## 2025-03-17 ASSESSMENT — ANXIETY QUESTIONNAIRES
GAD7 TOTAL SCORE: 0
4. TROUBLE RELAXING: NOT AT ALL
2. NOT BEING ABLE TO STOP OR CONTROL WORRYING: NOT AT ALL
7. FEELING AFRAID AS IF SOMETHING AWFUL MIGHT HAPPEN: NOT AT ALL
3. WORRYING TOO MUCH ABOUT DIFFERENT THINGS: NOT AT ALL
1. FEELING NERVOUS, ANXIOUS, OR ON EDGE: NOT AT ALL
5. BEING SO RESTLESS THAT IT IS HARD TO SIT STILL: NOT AT ALL
6. BECOMING EASILY ANNOYED OR IRRITABLE: NOT AT ALL

## 2025-03-17 ASSESSMENT — PATIENT HEALTH QUESTIONNAIRE - PHQ9
2. FEELING DOWN, DEPRESSED OR HOPELESS: NOT AT ALL
1. LITTLE INTEREST OR PLEASURE IN DOING THINGS: NOT AT ALL
SUM OF ALL RESPONSES TO PHQ9 QUESTIONS 1 AND 2: 0

## 2025-03-17 ASSESSMENT — PAIN SCALES - GENERAL: PAINLEVEL_OUTOF10: 7

## 2025-03-17 NOTE — ACP (ADVANCE CARE PLANNING)
Confirming Previous Code Status:   Advance Care Planning Note     Discussion Date: 03/17/25   Discussion Participants: patient    The patient wishes to discuss Advance Care Planning today and the following is a brief summary of our discussion.     Patient has capacity to make their own medical decisions: Yes  Health Care Agent/Surrogate Decision Maker documented in chart: Yes    Documents on file and valid:  Advance Directive/Living Will: Yes   Health Care Power of : Yes  Other: none    Communication of Medical Status/Prognosis:   yes     Communication of Treatment Goals/Options:   yes     Treatment Decisions  Goals of Care: survival is paramount regardless of prognosis, treatment outcome, or burden   yes  Follow Up Plan  no  Team Members  myself  Time Statement: Total face to face time spent on advance care planning was 16 minutes with 16 minutes spent in counseling, including the explanation.    Shaheed Hair DO  3/17/2025 11:07 AM

## 2025-03-17 NOTE — PROGRESS NOTES
"Subjective   Patient ID: Safia Pina is a 74 y.o. female who presents for Annual Exam (Assessment annual medicare wellness fasting bw).    HPI     Review of Systems   Constitutional: Negative.    HENT: Negative.     Respiratory: Negative.     Cardiovascular: Negative.    Gastrointestinal: Negative.    Endocrine: Negative.         DM   Genitourinary: Negative.    Musculoskeletal:  Positive for arthralgias and back pain.        Pain left shoulder   Neurological: Negative.    Psychiatric/Behavioral:  The patient is nervous/anxious.        Objective   /74 (BP Location: Left arm)   Pulse 92   Temp 37 °C (98.6 °F) (Oral)   Ht 1.651 m (5' 5\")   Wt 70.7 kg (155 lb 12.8 oz)   SpO2 94%   BMI 25.93 kg/m²     Physical Exam  Vitals and nursing note reviewed.   Constitutional:       Appearance: Normal appearance.   HENT:      Right Ear: Tympanic membrane normal.      Left Ear: Tympanic membrane normal.      Mouth/Throat:      Pharynx: Oropharynx is clear.   Cardiovascular:      Rate and Rhythm: Normal rate and regular rhythm.      Pulses: Normal pulses.      Heart sounds: Murmur heard.      Systolic murmur is present with a grade of 2/6.   Pulmonary:      Breath sounds: Normal breath sounds.   Abdominal:      Palpations: Abdomen is soft.   Musculoskeletal:      Comments: Pain left shoulder decreased ROM rotator cuff weakness   Neurological:      General: No focal deficit present.      Mental Status: She is alert and oriented to person, place, and time.   Psychiatric:         Mood and Affect: Mood normal.         Behavior: Behavior normal.         Assessment/Plan patient seen here for an annual Medicare wellness exam.  We reviewed her questionnaire she is agreeable to her responses.  We did discuss advanced directives.  She has no significant difficulty with depression however she has significant amount of anxiety she cares for her son who is disabled severely.  We are having her get lab work drawn today she is " getting an x-ray of her left shoulder possibly may need orthopedic referral or at least an intra-articular injection pending that outcome.  Will see her back as needed  Problem List Items Addressed This Visit             ICD-10-CM    Diabetic autonomic neuropathy associated with type 2 diabetes mellitus (Multi) E11.43    Relevant Orders    Hemoglobin A1C    Albumin-Creatinine Ratio, Urine Random    Benign essential hypertension I10    Biceps tendinitis M75.20    Relevant Orders    XR shoulder left 2+ views    Hyperlipidemia E78.5    Relevant Orders    Lipid Panel    CBC and Auto Differential    Comprehensive Metabolic Panel    Lumbar disc disease M51.9    Overweight with body mass index (BMI) 25.0-29.9 E66.3     Other Visit Diagnoses         Codes    Medicare annual wellness visit, subsequent    -  Primary Z00.00

## 2025-03-18 LAB
ALBUMIN SERPL-MCNC: 4.5 G/DL (ref 3.6–5.1)
ALBUMIN/CREAT UR: 7 MG/G CREAT
ALP SERPL-CCNC: 57 U/L (ref 37–153)
ALT SERPL-CCNC: 19 U/L (ref 6–29)
ANION GAP SERPL CALCULATED.4IONS-SCNC: 12 MMOL/L (CALC) (ref 7–17)
AST SERPL-CCNC: 22 U/L (ref 10–35)
BASOPHILS # BLD AUTO: 58 CELLS/UL (ref 0–200)
BASOPHILS NFR BLD AUTO: 0.8 %
BILIRUB SERPL-MCNC: 0.8 MG/DL (ref 0.2–1.2)
BUN SERPL-MCNC: 18 MG/DL (ref 7–25)
CALCIUM SERPL-MCNC: 9.9 MG/DL (ref 8.6–10.4)
CHLORIDE SERPL-SCNC: 102 MMOL/L (ref 98–110)
CHOLEST SERPL-MCNC: 255 MG/DL
CHOLEST/HDLC SERPL: 2.9 (CALC)
CO2 SERPL-SCNC: 22 MMOL/L (ref 20–32)
CREAT SERPL-MCNC: 0.61 MG/DL (ref 0.6–1)
CREAT UR-MCNC: 54 MG/DL (ref 20–275)
EGFRCR SERPLBLD CKD-EPI 2021: 93 ML/MIN/1.73M2
EOSINOPHIL # BLD AUTO: 187 CELLS/UL (ref 15–500)
EOSINOPHIL NFR BLD AUTO: 2.6 %
ERYTHROCYTE [DISTWIDTH] IN BLOOD BY AUTOMATED COUNT: 12.5 % (ref 11–15)
EST. AVERAGE GLUCOSE BLD GHB EST-MCNC: 171 MG/DL
EST. AVERAGE GLUCOSE BLD GHB EST-SCNC: 9.5 MMOL/L
GLUCOSE SERPL-MCNC: 188 MG/DL (ref 65–99)
HBA1C MFR BLD: 7.6 % OF TOTAL HGB
HCT VFR BLD AUTO: 43 % (ref 35–45)
HDLC SERPL-MCNC: 89 MG/DL
HGB BLD-MCNC: 13.9 G/DL (ref 11.7–15.5)
LDLC SERPL CALC-MCNC: 150 MG/DL (CALC)
LYMPHOCYTES # BLD AUTO: 1296 CELLS/UL (ref 850–3900)
LYMPHOCYTES NFR BLD AUTO: 18 %
MCH RBC QN AUTO: 29.1 PG (ref 27–33)
MCHC RBC AUTO-ENTMCNC: 32.3 G/DL (ref 32–36)
MCV RBC AUTO: 90.1 FL (ref 80–100)
MICROALBUMIN UR-MCNC: 0.4 MG/DL
MONOCYTES # BLD AUTO: 439 CELLS/UL (ref 200–950)
MONOCYTES NFR BLD AUTO: 6.1 %
NEUTROPHILS # BLD AUTO: 5220 CELLS/UL (ref 1500–7800)
NEUTROPHILS NFR BLD AUTO: 72.5 %
NONHDLC SERPL-MCNC: 166 MG/DL (CALC)
PLATELET # BLD AUTO: 344 THOUSAND/UL (ref 140–400)
PMV BLD REES-ECKER: 10.9 FL (ref 7.5–12.5)
POTASSIUM SERPL-SCNC: 5 MMOL/L (ref 3.5–5.3)
PROT SERPL-MCNC: 7.2 G/DL (ref 6.1–8.1)
RBC # BLD AUTO: 4.77 MILLION/UL (ref 3.8–5.1)
SODIUM SERPL-SCNC: 136 MMOL/L (ref 135–146)
TRIGL SERPL-MCNC: 67 MG/DL
WBC # BLD AUTO: 7.2 THOUSAND/UL (ref 3.8–10.8)

## 2025-06-02 DIAGNOSIS — E11.43 DIABETIC AUTONOMIC NEUROPATHY ASSOCIATED WITH TYPE 2 DIABETES MELLITUS: ICD-10-CM

## 2025-06-02 RX ORDER — GLIMEPIRIDE 2 MG/1
2 TABLET ORAL 2 TIMES DAILY
Qty: 180 TABLET | Refills: 3 | Status: SHIPPED | OUTPATIENT
Start: 2025-06-02 | End: 2026-06-02

## 2025-06-27 ENCOUNTER — OFFICE VISIT (OUTPATIENT)
Dept: URGENT CARE | Age: 75
End: 2025-06-27
Payer: MEDICARE

## 2025-06-27 ENCOUNTER — ANCILLARY PROCEDURE (OUTPATIENT)
Dept: URGENT CARE | Age: 75
End: 2025-06-27
Payer: MEDICARE

## 2025-06-27 VITALS
TEMPERATURE: 98.2 F | DIASTOLIC BLOOD PRESSURE: 82 MMHG | BODY MASS INDEX: 25.83 KG/M2 | OXYGEN SATURATION: 97 % | WEIGHT: 155 LBS | RESPIRATION RATE: 16 BRPM | HEIGHT: 65 IN | HEART RATE: 107 BPM | SYSTOLIC BLOOD PRESSURE: 154 MMHG

## 2025-06-27 DIAGNOSIS — L30.4 INTERTRIGO: ICD-10-CM

## 2025-06-27 DIAGNOSIS — R68.89 FLU-LIKE SYMPTOMS: Primary | ICD-10-CM

## 2025-06-27 DIAGNOSIS — R61 DIAPHORESIS: ICD-10-CM

## 2025-06-27 DIAGNOSIS — R68.89 FLU-LIKE SYMPTOMS: ICD-10-CM

## 2025-06-27 DIAGNOSIS — J06.9 UPPER RESPIRATORY TRACT INFECTION, UNSPECIFIED TYPE: ICD-10-CM

## 2025-06-27 LAB
POC CORONAVIRUS SARS-COV-2 PCR: NEGATIVE
POC FINGERSTICK BLOOD GLUCOSE: 266 MG/DL (ref 70–100)
POC HUMAN RHINOVIRUS PCR: NEGATIVE
POC INFLUENZA A VIRUS PCR: NEGATIVE
POC INFLUENZA B VIRUS PCR: NEGATIVE
POC RESPIRATORY SYNCYTIAL VIRUS PCR: NEGATIVE

## 2025-06-27 PROCEDURE — 71046 X-RAY EXAM CHEST 2 VIEWS: CPT | Performed by: PHYSICIAN ASSISTANT

## 2025-06-27 RX ORDER — CLOTRIMAZOLE 1 G/ML
SOLUTION TOPICAL 2 TIMES DAILY
Qty: 15 ML | Refills: 0 | Status: SHIPPED | OUTPATIENT
Start: 2025-06-27 | End: 2025-07-11

## 2025-06-27 RX ORDER — AZITHROMYCIN 250 MG/1
TABLET, FILM COATED ORAL
Qty: 6 TABLET | Refills: 0 | Status: SHIPPED | OUTPATIENT
Start: 2025-06-27 | End: 2025-07-02

## 2025-06-27 RX ORDER — BENZONATATE 200 MG/1
200 CAPSULE ORAL 3 TIMES DAILY PRN
Qty: 20 CAPSULE | Refills: 0 | Status: SHIPPED | OUTPATIENT
Start: 2025-06-27 | End: 2025-07-04

## 2025-06-27 ASSESSMENT — PATIENT HEALTH QUESTIONNAIRE - PHQ9
2. FEELING DOWN, DEPRESSED OR HOPELESS: NOT AT ALL
SUM OF ALL RESPONSES TO PHQ9 QUESTIONS 1 AND 2: 0
1. LITTLE INTEREST OR PLEASURE IN DOING THINGS: NOT AT ALL

## 2025-06-27 ASSESSMENT — ENCOUNTER SYMPTOMS
FATIGUE: 1
SORE THROAT: 0
APPETITE CHANGE: 1
FEVER: 0
DIARRHEA: 1
DIAPHORESIS: 1
SHORTNESS OF BREATH: 1
COUGH: 1

## 2025-06-27 NOTE — PROGRESS NOTES
Subjective   Patient ID: Safia Pina is a 74 y.o. female. They present today with a chief complaint of Diarrhea (Started 1 week ago), Rash (In the groin and on the abdomen ), and Nasal Congestion (With cough and also a lot of sweating).    History of Present Illness  Patient is a 74 year old female with history of type 2 DM presenting for multiple concerns. She reports symptoms started 5 days ago with diarrhea. She also reports nonproductive cough, post nasal drip, nasal congestion and sweating. No fever. No sick contacts.  Also endorses rash to bilateral groin that burns at times but no itching. Diarrhea has slowed down since onset 5 days ago, now going 1-2 times a day but still loose. History of bronchitis in the past. She reports feeling fatigued and short of breath at times. She is using cough drops for symptoms. Blood sugar has been high in the 260s. Not on insulin.       History provided by:  Patient   used: No    Diarrhea  Associated symptoms: diaphoresis    Associated symptoms: no fever    Rash  Associated symptoms include congestion, coughing, diarrhea, fatigue and shortness of breath. Pertinent negatives include no fever or sore throat.       Past Medical History  Allergies as of 06/27/2025    (No Known Allergies)       Prescriptions Prior to Admission[1]     Medical History[2]    Surgical History[3]     reports that she has never smoked. She has never used smokeless tobacco. She reports that she does not drink alcohol and does not use drugs.    Review of Systems  Review of Systems   Constitutional:  Positive for appetite change, diaphoresis and fatigue. Negative for fever.   HENT:  Positive for congestion and postnasal drip. Negative for sore throat.    Respiratory:  Positive for cough and shortness of breath.    Gastrointestinal:  Positive for diarrhea.   Skin:  Positive for rash.                                  Objective    Vitals:    06/27/25 1159   BP: 154/82   BP Location:  "Left arm   Patient Position: Sitting   Pulse: 107   Resp: 16   Temp: 36.8 °C (98.2 °F)   SpO2: 97%   Weight: 70.3 kg (155 lb)   Height: 1.651 m (5' 5\")     No LMP recorded.    Physical Exam  Constitutional:       General: She is not in acute distress.     Appearance: Normal appearance. She is normal weight. She is not ill-appearing, toxic-appearing or diaphoretic.   HENT:      Head: Normocephalic. No right periorbital erythema or left periorbital erythema.      Jaw: There is normal jaw occlusion. No trismus.      Right Ear: Tympanic membrane, ear canal and external ear normal. There is no impacted cerumen.      Left Ear: Tympanic membrane, ear canal and external ear normal. There is no impacted cerumen.      Mouth/Throat:      Mouth: Mucous membranes are moist.      Pharynx: Oropharynx is clear. Uvula midline. No pharyngeal swelling, oropharyngeal exudate, posterior oropharyngeal erythema, uvula swelling or postnasal drip.      Tonsils: No tonsillar exudate or tonsillar abscesses.   Eyes:      General: No scleral icterus.        Right eye: No discharge.         Left eye: No discharge.      Conjunctiva/sclera: Conjunctivae normal.   Neck:      Trachea: Phonation normal.   Cardiovascular:      Rate and Rhythm: Normal rate and regular rhythm.      Heart sounds: No murmur heard.     No friction rub. No gallop.   Pulmonary:      Effort: Pulmonary effort is normal. No respiratory distress.      Breath sounds: Normal breath sounds. No stridor. No wheezing, rhonchi or rales.   Abdominal:      General: Abdomen is flat.      Palpations: Abdomen is soft.      Tenderness: There is no abdominal tenderness. There is no guarding or rebound.   Skin:     Comments: Erythematous rash in bilateral inguinal folds   Neurological:      Mental Status: She is alert.   Psychiatric:         Mood and Affect: Mood normal.         Behavior: Behavior normal.         Thought Content: Thought content normal.         Procedures    Point of Care Test " & Imaging Results from this visit  Results for orders placed or performed in visit on 06/27/25   POCT SPOTFIRE R/ST Panel Mini w/COVID ([facility]) manually resulted    Specimen: Swab   Result Value Ref Range    POC Sars-Cov-2 PCR Negative Negative    POC Respiratory Syncytial Virus PCR Negative Negative    POC Influenza A Virus PCR Negative Negative    POC Influenza B Virus PCR Negative Negative    POC Human Rhinovirus PCR Negative Negative   POCT glucose manually resulted   Result Value Ref Range    POC Fingerstick Blood Glucose 266 (A) 70 - 100 mg/dl      Imaging  XR chest 2 views  Result Date: 6/27/2025  Hyperinflated lungs without acute cardiopulmonary process.     MACRO: None   Signed by: Chuy Sanchez 6/27/2025 12:59 PM Dictation workstation:   [id number]      Cardiology, Vascular, and Other Imaging  No other imaging results found for the past 2 days      Diagnostic study results (if any) were reviewed by Alma Corbin PA-C.    Assessment/Plan   Allergies, medications, history, and pertinent labs/EKGs/Imaging reviewed by Alma Corbin PA-C.     Medical Decision Making  Patient is a 74 year old female presenting for evaluation of flu like symptoms. Hemodynamically stable. Nontoxic appearing, no meningismus. Posterior oropharynx clear, no exudates or vesicular lesions. Uvula is midline and there is no asymmetrical swelling of tonsils, drooling, trismus or muffled voice to suggest RPA or PTA. TM without erythema or bulging to suggest otitis media. Lungs clear to auscultation, low suspicion for pneumonia. Viral testing negative. Chest x ray without pneumonia. Rash on exam is suspicious for intertrigo will start clotrimazole. Will also treat for URI with cough medicine and antibiotic.  Discussed supportive care, OTC medications as needed, tylenol/ibuprofen for pain or fever, rest, fluids. ER if high blood sugar especially over 300, trouble swallowing, difficulty breathing, high fevers not responding to  antipyretics, confusion, weakness, lethargy, dizziness, chest pain or shortness of breath.  Close follow up with PCP for uncontrolled blood sugar.     At time of discharge, patient was clinically well-appearing and appropriate for outpatient management. The patient/parent/guardian was educated regarding diagnosis, supportive care, OTC and Rx medications. The patient/parent/guardian was given the opportunity to ask questions prior to discharge. They verbalized understanding of discussion of treatment plan, expected course of illness and/or injury, indications on when to return to , when to seek further evaluation in ED/call 911, and the need to follow up with PCP and/or specialist as referred. Patient/parent/guardian was provided with work/school documentation if requested. Patient stable upon discharge.     Orders and Diagnoses  Diagnoses and all orders for this visit:  Flu-like symptoms  -     POCT SPOTFIRE R/ST Panel Mini w/COVID ([facility]) manually resulted  -     XR chest 2 views; Future  Diaphoresis  -     POCT glucose manually resulted  Intertrigo  -     clotrimazole (Lotrimin) 1 % external solution; Apply topically 2 times a day for 14 days.  Upper respiratory tract infection, unspecified type  -     azithromycin (Zithromax) 250 mg tablet; Take 2 tablets (500 mg) by mouth once daily for 1 day, THEN 1 tablet (250 mg) once daily for 4 days.  -     benzonatate (Tessalon) 200 mg capsule; Take 1 capsule (200 mg) by mouth 3 times a day as needed for cough for up to 7 days. Do not crush or chew.      Medical Admin Record      Patient disposition: Home    Electronically signed by Alma Corbin PA-C  3:03 PM           [1] (Not in a hospital admission)   [2]   Past Medical History:  Diagnosis Date    Body mass index (BMI) 24.0-24.9, adult 12/20/2021    Body mass index (BMI) of 24.0 to 24.9 in adult    Body mass index (BMI) 26.0-26.9, adult 03/14/2022    BMI 26.0-26.9,adult    Personal history of other specified  conditions 03/29/2019    History of abnormal weight loss   [3]   Past Surgical History:  Procedure Laterality Date    OTHER SURGICAL HISTORY  03/29/2019    Gallbladder surgery

## 2026-03-20 ENCOUNTER — APPOINTMENT (OUTPATIENT)
Dept: PRIMARY CARE | Facility: CLINIC | Age: 76
End: 2026-03-20
Payer: MEDICARE